# Patient Record
Sex: MALE | Race: WHITE | HISPANIC OR LATINO | Employment: FULL TIME | URBAN - METROPOLITAN AREA
[De-identification: names, ages, dates, MRNs, and addresses within clinical notes are randomized per-mention and may not be internally consistent; named-entity substitution may affect disease eponyms.]

---

## 2023-06-01 ENCOUNTER — HOSPITAL ENCOUNTER (EMERGENCY)
Facility: MEDICAL CENTER | Age: 38
End: 2023-06-01
Attending: EMERGENCY MEDICINE

## 2023-06-01 ENCOUNTER — APPOINTMENT (OUTPATIENT)
Dept: RADIOLOGY | Facility: MEDICAL CENTER | Age: 38
End: 2023-06-01
Attending: EMERGENCY MEDICINE

## 2023-06-01 VITALS
OXYGEN SATURATION: 96 % | HEART RATE: 131 BPM | SYSTOLIC BLOOD PRESSURE: 161 MMHG | DIASTOLIC BLOOD PRESSURE: 105 MMHG | RESPIRATION RATE: 18 BRPM | TEMPERATURE: 98.2 F | HEIGHT: 67 IN | WEIGHT: 197.09 LBS | BODY MASS INDEX: 30.93 KG/M2

## 2023-06-01 DIAGNOSIS — F10.930 ALCOHOL WITHDRAWAL SYNDROME WITHOUT COMPLICATION (HCC): ICD-10-CM

## 2023-06-01 DIAGNOSIS — R07.9 CHEST PAIN, UNSPECIFIED TYPE: ICD-10-CM

## 2023-06-01 DIAGNOSIS — F15.10 METHAMPHETAMINE USE (HCC): ICD-10-CM

## 2023-06-01 DIAGNOSIS — R00.2 PALPITATIONS: ICD-10-CM

## 2023-06-01 LAB
ALBUMIN SERPL BCP-MCNC: 4.6 G/DL (ref 3.2–4.9)
ALBUMIN/GLOB SERPL: 1.4 G/DL
ALP SERPL-CCNC: 129 U/L (ref 30–99)
ALT SERPL-CCNC: 58 U/L (ref 2–50)
ANION GAP SERPL CALC-SCNC: 19 MMOL/L (ref 7–16)
APPEARANCE UR: CLEAR
AST SERPL-CCNC: 55 U/L (ref 12–45)
BACTERIA #/AREA URNS HPF: NEGATIVE /HPF
BASOPHILS # BLD AUTO: 1.5 % (ref 0–1.8)
BASOPHILS # BLD: 0.1 K/UL (ref 0–0.12)
BILIRUB SERPL-MCNC: 0.4 MG/DL (ref 0.1–1.5)
BILIRUB UR QL STRIP.AUTO: NEGATIVE
BUN SERPL-MCNC: 6 MG/DL (ref 8–22)
CALCIUM ALBUM COR SERPL-MCNC: 8.9 MG/DL (ref 8.5–10.5)
CALCIUM SERPL-MCNC: 9.4 MG/DL (ref 8.5–10.5)
CHLORIDE SERPL-SCNC: 98 MMOL/L (ref 96–112)
CO2 SERPL-SCNC: 20 MMOL/L (ref 20–33)
COLOR UR: YELLOW
CREAT SERPL-MCNC: 0.66 MG/DL (ref 0.5–1.4)
EKG IMPRESSION: NORMAL
EOSINOPHIL # BLD AUTO: 0.02 K/UL (ref 0–0.51)
EOSINOPHIL NFR BLD: 0.3 % (ref 0–6.9)
EPI CELLS #/AREA URNS HPF: NEGATIVE /HPF
ERYTHROCYTE [DISTWIDTH] IN BLOOD BY AUTOMATED COUNT: 43.5 FL (ref 35.9–50)
GFR SERPLBLD CREATININE-BSD FMLA CKD-EPI: 125 ML/MIN/1.73 M 2
GLOBULIN SER CALC-MCNC: 3.4 G/DL (ref 1.9–3.5)
GLUCOSE SERPL-MCNC: 128 MG/DL (ref 65–99)
GLUCOSE UR STRIP.AUTO-MCNC: NEGATIVE MG/DL
HCT VFR BLD AUTO: 53.9 % (ref 42–52)
HGB BLD-MCNC: 17.6 G/DL (ref 14–18)
HYALINE CASTS #/AREA URNS LPF: ABNORMAL /LPF
IMM GRANULOCYTES # BLD AUTO: 0.02 K/UL (ref 0–0.11)
IMM GRANULOCYTES NFR BLD AUTO: 0.3 % (ref 0–0.9)
KETONES UR STRIP.AUTO-MCNC: 15 MG/DL
LEUKOCYTE ESTERASE UR QL STRIP.AUTO: NEGATIVE
LIPASE SERPL-CCNC: 15 U/L (ref 11–82)
LYMPHOCYTES # BLD AUTO: 2.18 K/UL (ref 1–4.8)
LYMPHOCYTES NFR BLD: 32.4 % (ref 22–41)
MCH RBC QN AUTO: 29 PG (ref 27–33)
MCHC RBC AUTO-ENTMCNC: 32.7 G/DL (ref 32.3–36.5)
MCV RBC AUTO: 88.9 FL (ref 81.4–97.8)
MICRO URNS: ABNORMAL
MONOCYTES # BLD AUTO: 0.72 K/UL (ref 0–0.85)
MONOCYTES NFR BLD AUTO: 10.7 % (ref 0–13.4)
NEUTROPHILS # BLD AUTO: 3.69 K/UL (ref 1.82–7.42)
NEUTROPHILS NFR BLD: 54.8 % (ref 44–72)
NITRITE UR QL STRIP.AUTO: NEGATIVE
NRBC # BLD AUTO: 0 K/UL
NRBC BLD-RTO: 0 /100 WBC (ref 0–0.2)
PH UR STRIP.AUTO: 6.5 [PH] (ref 5–8)
PLATELET # BLD AUTO: 355 K/UL (ref 164–446)
PMV BLD AUTO: 9.1 FL (ref 9–12.9)
POTASSIUM SERPL-SCNC: 4 MMOL/L (ref 3.6–5.5)
PROT SERPL-MCNC: 8 G/DL (ref 6–8.2)
PROT UR QL STRIP: 30 MG/DL
RBC # BLD AUTO: 6.06 M/UL (ref 4.7–6.1)
RBC # URNS HPF: ABNORMAL /HPF
RBC UR QL AUTO: NEGATIVE
SODIUM SERPL-SCNC: 137 MMOL/L (ref 135–145)
SP GR UR STRIP.AUTO: 1.01
TROPONIN T SERPL-MCNC: <6 NG/L (ref 6–19)
TROPONIN T SERPL-MCNC: <6 NG/L (ref 6–19)
UROBILINOGEN UR STRIP.AUTO-MCNC: 0.2 MG/DL
WBC # BLD AUTO: 6.7 K/UL (ref 4.8–10.8)
WBC #/AREA URNS HPF: ABNORMAL /HPF

## 2023-06-01 PROCEDURE — 93005 ELECTROCARDIOGRAM TRACING: CPT

## 2023-06-01 PROCEDURE — 96375 TX/PRO/DX INJ NEW DRUG ADDON: CPT

## 2023-06-01 PROCEDURE — 700105 HCHG RX REV CODE 258

## 2023-06-01 PROCEDURE — 99285 EMERGENCY DEPT VISIT HI MDM: CPT

## 2023-06-01 PROCEDURE — 700111 HCHG RX REV CODE 636 W/ 250 OVERRIDE (IP): Performed by: EMERGENCY MEDICINE

## 2023-06-01 PROCEDURE — 80053 COMPREHEN METABOLIC PANEL: CPT

## 2023-06-01 PROCEDURE — 700111 HCHG RX REV CODE 636 W/ 250 OVERRIDE (IP)

## 2023-06-01 PROCEDURE — 93005 ELECTROCARDIOGRAM TRACING: CPT | Performed by: EMERGENCY MEDICINE

## 2023-06-01 PROCEDURE — 96374 THER/PROPH/DIAG INJ IV PUSH: CPT

## 2023-06-01 PROCEDURE — 85025 COMPLETE CBC W/AUTO DIFF WBC: CPT

## 2023-06-01 PROCEDURE — 83690 ASSAY OF LIPASE: CPT

## 2023-06-01 PROCEDURE — 84484 ASSAY OF TROPONIN QUANT: CPT | Mod: 91

## 2023-06-01 PROCEDURE — 700105 HCHG RX REV CODE 258: Performed by: EMERGENCY MEDICINE

## 2023-06-01 PROCEDURE — 81001 URINALYSIS AUTO W/SCOPE: CPT

## 2023-06-01 PROCEDURE — 36415 COLL VENOUS BLD VENIPUNCTURE: CPT

## 2023-06-01 PROCEDURE — 71045 X-RAY EXAM CHEST 1 VIEW: CPT

## 2023-06-01 RX ORDER — LORAZEPAM 2 MG/ML
2 INJECTION INTRAMUSCULAR ONCE
Status: COMPLETED | OUTPATIENT
Start: 2023-06-01 | End: 2023-06-01

## 2023-06-01 RX ORDER — DIAZEPAM 5 MG/1
5-10 TABLET ORAL EVERY 6 HOURS PRN
Qty: 12 TABLET | Refills: 0 | Status: SHIPPED | OUTPATIENT
Start: 2023-06-01 | End: 2023-06-01 | Stop reason: SDUPTHER

## 2023-06-01 RX ORDER — DIAZEPAM 5 MG/1
5-10 TABLET ORAL EVERY 6 HOURS PRN
Qty: 12 TABLET | Refills: 0 | Status: SHIPPED | OUTPATIENT
Start: 2023-06-01 | End: 2023-06-04

## 2023-06-01 RX ORDER — DIAZEPAM 5 MG/ML
10 INJECTION, SOLUTION INTRAMUSCULAR; INTRAVENOUS ONCE
Status: COMPLETED | OUTPATIENT
Start: 2023-06-01 | End: 2023-06-01

## 2023-06-01 RX ORDER — SODIUM CHLORIDE, SODIUM LACTATE, POTASSIUM CHLORIDE, CALCIUM CHLORIDE 600; 310; 30; 20 MG/100ML; MG/100ML; MG/100ML; MG/100ML
1000 INJECTION, SOLUTION INTRAVENOUS ONCE
Status: COMPLETED | OUTPATIENT
Start: 2023-06-01 | End: 2023-06-01

## 2023-06-01 RX ORDER — SODIUM CHLORIDE 9 MG/ML
1000 INJECTION, SOLUTION INTRAVENOUS ONCE
Status: COMPLETED | OUTPATIENT
Start: 2023-06-01 | End: 2023-06-01

## 2023-06-01 RX ORDER — LORAZEPAM 2 MG/ML
2 INJECTION INTRAMUSCULAR ONCE
Status: DISCONTINUED | OUTPATIENT
Start: 2023-06-01 | End: 2023-06-01

## 2023-06-01 RX ADMIN — DIAZEPAM 10 MG: 5 INJECTION, SOLUTION INTRAMUSCULAR; INTRAVENOUS at 15:07

## 2023-06-01 RX ADMIN — LORAZEPAM 2 MG: 2 INJECTION INTRAMUSCULAR; INTRAVENOUS at 11:01

## 2023-06-01 RX ADMIN — SODIUM CHLORIDE 1000 ML: 9 INJECTION, SOLUTION INTRAVENOUS at 11:04

## 2023-06-01 RX ADMIN — SODIUM CHLORIDE, POTASSIUM CHLORIDE, SODIUM LACTATE AND CALCIUM CHLORIDE 1000 ML: 600; 310; 30; 20 INJECTION, SOLUTION INTRAVENOUS at 15:06

## 2023-06-01 ASSESSMENT — LIFESTYLE VARIABLES
PAROXYSMAL SWEATS: NO SWEAT VISIBLE
ON A TYPICAL DAY WHEN YOU DRINK ALCOHOL HOW MANY DRINKS DO YOU HAVE: 4
VISUAL DISTURBANCES: NOT PRESENT
TOTAL SCORE: 2
AUDITORY DISTURBANCES: NOT PRESENT
ORIENTATION AND CLOUDING OF SENSORIUM: ORIENTED AND CAN DO SERIAL ADDITIONS
DO YOU DRINK ALCOHOL: YES
HEADACHE, FULLNESS IN HEAD: NOT PRESENT
AGITATION: NORMAL ACTIVITY
AUDITORY DISTURBANCES: NOT PRESENT
NAUSEA AND VOMITING: NO NAUSEA AND NO VOMITING
ANXIETY: MODERATELY ANXIOUS OR GUARDED, SO ANXIETY IS INFERRED
HEADACHE, FULLNESS IN HEAD: NOT PRESENT
EVER HAD A DRINK FIRST THING IN THE MORNING TO STEADY YOUR NERVES TO GET RID OF A HANGOVER: NO
NAUSEA AND VOMITING: NO NAUSEA AND NO VOMITING
TREMOR: TREMOR NOT VISIBLE BUT CAN BE FELT, FINGERTIP TO FINGERTIP
TOTAL SCORE: 2
CONSUMPTION TOTAL: POSITIVE
ANXIETY: *
HOW MANY TIMES IN THE PAST YEAR HAVE YOU HAD 5 OR MORE DRINKS IN A DAY: 0
PAROXYSMAL SWEATS: NO SWEAT VISIBLE
AGITATION: NORMAL ACTIVITY
TOTAL SCORE: 5
TREMOR: TREMOR NOT VISIBLE BUT CAN BE FELT, FINGERTIP TO FINGERTIP
ORIENTATION AND CLOUDING OF SENSORIUM: ORIENTED AND CAN DO SERIAL ADDITIONS
TOTAL SCORE: 2
HAVE YOU EVER FELT YOU SHOULD CUT DOWN ON YOUR DRINKING: YES
VISUAL DISTURBANCES: NOT PRESENT
HAVE PEOPLE ANNOYED YOU BY CRITICIZING YOUR DRINKING: YES
TOTAL SCORE: 4
AVERAGE NUMBER OF DAYS PER WEEK YOU HAVE A DRINK CONTAINING ALCOHOL: 5
EVER FELT BAD OR GUILTY ABOUT YOUR DRINKING: NO

## 2023-06-01 ASSESSMENT — PAIN DESCRIPTION - PAIN TYPE
TYPE: ACUTE PAIN
TYPE: ACUTE PAIN

## 2023-06-01 NOTE — ED NOTES
Checked on bed, connected to monitor,  with unlabored respirations. Denied any new complaints. Gurney in low position, side rail up for pt safety. Call light within reach. Will continue to monitor

## 2023-06-01 NOTE — ED PROVIDER NOTES
ED Provider Note    CHIEF COMPLAINT  Chief Complaint   Patient presents with    Palpitations     Started today.    Chest Pain     Started today. Dull pain located mid chest. Pain does not radiate.    Abdominal Cramping     Mid abd cramping started today. Pt has been drinking alcohol in the past 3 days and has not been eating. Last drink was last night. Pt also admits to doing meth last night.       LIMITATION TO HISTORY   Select: Malaysian-speaking but we were able to use a  line    HPI    Charlie Weaver is a 37 y.o. male who presents to the Emergency Department with a chief complaint of chest pain he describes it simply as uncomfortable feeling.  It is not sharp it is not pressure.  It is not rating to the arm at the neck or the back.  Duration since 5:00 in the morning fairly constant.  May be worse with deep breaths.  And also located in the epigastric area.  Patient has no history of heart attack in the family.  He is positive for using crystal methamphetamines today/yesterday.  Also has been drinking and stopped this morning.    Patient has no history of smoking.  No history of cholesterol.  No family history.  No diabetes.  No history of hypertension.    OUTSIDE HISTORIAN(S):  Select: None    EXTERNAL RECORDS REVIEWED  Select: Other no previous visits noted    REVIEW OF SYSTEMS  General no fever or chills.  Pulmonary no difficulty breathing      PAST MEDICAL HISTORY  History reviewed. No pertinent past medical history.    FAMILY HISTORY  History reviewed. No pertinent family history.    SOCIAL HISTORY  Social History     Tobacco Use    Smoking status: Never    Smokeless tobacco: Never   Vaping Use    Vaping Use: Never used   Substance Use Topics    Alcohol use: Yes     Comment: occ    Drug use: Yes     Comment: meth     Social History     Substance and Sexual Activity   Drug Use Yes    Comment: meth       SURGICAL HISTORY  History reviewed. No pertinent surgical history.    CURRENT  "MEDICATIONS  No current facility-administered medications for this encounter.  No current outpatient medications on file.    ALLERGIES  No Known Allergies    PHYSICAL EXAM  VITAL SIGNS: BP (!) 148/105   Pulse (!) 155   Temp 36.5 °C (97.7 °F) (Temporal)   Resp 16   Ht 1.702 m (5' 7\")   Wt 89.4 kg (197 lb 1.5 oz)   SpO2 99%   BMI 30.87 kg/m²   Reviewed and tachycardia noted.  Elevated blood pressure noted.  Most likely secondary to methamphetamine use.  Constitutional: Well developed, Well nourished, uncomfortable appearing anxious..  HENT: Normocephalic, atraumatic, bilateral external ears normal, No intraoral erythema, edema, exudate  Eyes: PERRLA, conjunctiva pink, no scleral icterus.   Cardiovascular: Tachycardia no murmurs, rubs or gallops.  No dependent edema or calf tenderness  Respiratory: Lungs clear to auscultation bilaterally. No wheezes, rales, or rhonchi.  Abdominal:  Abdomen soft, non-tender, non distended. No rebound, or guarding.    Skin: No erythema, no rash. No wounds or bruising.  Genitourinary: No costovertebral angle tenderness.   Musculoskeletal: no deformities.   Neurologic: Alert, no facial droop noted. All extra ocular muscles intact. Moves all extremities with out weakness noed  Psychiatric: Anxious appearing fidgeting.        MEDICAL DECISION MAKING:  PROBLEMS EVALUATED THIS VISIT:  Chest pain.  The patient has a history of methamphetamine use.  May be methamphetamine use related.  Ischemia.  Less likely dissection, PE although in the differential among others.  Patient be treated with benzodiazepines.  Fluid.  Serial troponins.  Methamphetamine use.  Ativan for immediate treatment.  Counseling.  History of alcohol use.  Withdrawal may be complicating the symptoms as well.  Causing the tachycardia.  Treat with Ativan.  Counseling.         PLAN:  Medications   LORazepam (ATIVAN) injection 2 mg (2 mg Intravenous Given 6/1/23 1101)   NS (BOLUS) infusion 1,000 mL (1,000 mL Intravenous New " Bag 6/1/23 1104)   -year-old troponins  X-ray  EKG  Lab work.    RISK:  Pain with a history of methamphetamine use can cause cardiac ischemia which can cause long-term morbidity and even mortality if not properly detected.    RESULTS  Troponins were negative lab work.  Chest x-ray unremarkable.    LABS Ordered and Reviewed by Me:  Results for orders placed or performed during the hospital encounter of 06/01/23   CBC WITH DIFFERENTIAL   Result Value Ref Range    WBC 6.7 4.8 - 10.8 K/uL    RBC 6.06 4.70 - 6.10 M/uL    Hemoglobin 17.6 14.0 - 18.0 g/dL    Hematocrit 53.9 (H) 42.0 - 52.0 %    MCV 88.9 81.4 - 97.8 fL    MCH 29.0 27.0 - 33.0 pg    MCHC 32.7 32.3 - 36.5 g/dL    RDW 43.5 35.9 - 50.0 fL    Platelet Count 355 164 - 446 K/uL    MPV 9.1 9.0 - 12.9 fL    Neutrophils-Polys 54.80 44.00 - 72.00 %    Lymphocytes 32.40 22.00 - 41.00 %    Monocytes 10.70 0.00 - 13.40 %    Eosinophils 0.30 0.00 - 6.90 %    Basophils 1.50 0.00 - 1.80 %    Immature Granulocytes 0.30 0.00 - 0.90 %    Nucleated RBC 0.00 0.00 - 0.20 /100 WBC    Neutrophils (Absolute) 3.69 1.82 - 7.42 K/uL    Lymphs (Absolute) 2.18 1.00 - 4.80 K/uL    Monos (Absolute) 0.72 0.00 - 0.85 K/uL    Eos (Absolute) 0.02 0.00 - 0.51 K/uL    Baso (Absolute) 0.10 0.00 - 0.12 K/uL    Immature Granulocytes (abs) 0.02 0.00 - 0.11 K/uL    NRBC (Absolute) 0.00 K/uL   COMP METABOLIC PANEL   Result Value Ref Range    Sodium 137 135 - 145 mmol/L    Potassium 4.0 3.6 - 5.5 mmol/L    Chloride 98 96 - 112 mmol/L    Co2 20 20 - 33 mmol/L    Anion Gap 19.0 (H) 7.0 - 16.0    Glucose 128 (H) 65 - 99 mg/dL    Bun 6 (L) 8 - 22 mg/dL    Creatinine 0.66 0.50 - 1.40 mg/dL    Calcium 9.4 8.5 - 10.5 mg/dL    AST(SGOT) 55 (H) 12 - 45 U/L    ALT(SGPT) 58 (H) 2 - 50 U/L    Alkaline Phosphatase 129 (H) 30 - 99 U/L    Total Bilirubin 0.4 0.1 - 1.5 mg/dL    Albumin 4.6 3.2 - 4.9 g/dL    Total Protein 8.0 6.0 - 8.2 g/dL    Globulin 3.4 1.9 - 3.5 g/dL    A-G Ratio 1.4 g/dL   LIPASE   Result Value  Ref Range    Lipase 15 11 - 82 U/L   URINALYSIS    Specimen: Urine   Result Value Ref Range    Color Yellow     Character Clear     Specific Gravity 1.014 <1.035    Ph 6.5 5.0 - 8.0    Glucose Negative Negative mg/dL    Ketones 15 (A) Negative mg/dL    Protein 30 (A) Negative mg/dL    Bilirubin Negative Negative    Urobilinogen, Urine 0.2 Negative    Nitrite Negative Negative    Leukocyte Esterase Negative Negative    Occult Blood Negative Negative    Micro Urine Req Microscopic    CORRECTED CALCIUM   Result Value Ref Range    Correct Calcium 8.9 8.5 - 10.5 mg/dL   ESTIMATED GFR   Result Value Ref Range    GFR (CKD-EPI) 125 >60 mL/min/1.73 m 2   Troponin - STAT Once   Result Value Ref Range    Troponin T <6 6 - 19 ng/L   TROPONIN   Result Value Ref Range    Troponin T <6 6 - 19 ng/L   URINE MICROSCOPIC (W/UA)   Result Value Ref Range    WBC 0-2 (A) /hpf    RBC 0-2 (A) /hpf    Bacteria Negative None /hpf    Epithelial Cells Negative /hpf    Hyaline Cast 0-2 /lpf   EKG   Result Value Ref Range    Report       [facility] Emergency Dept.    Test Date:  2023  Pt Name:    FITO DENNISON      Department: ER  MRN:        [id number]                      Room:  Gender:     Male                         Technician: [id number]  :        1985                   Requested By:ER TRIAGE PROTOCOL  Order #:    [id number]                    Reading MD: James STEPHENSON MD    Measurements  Intervals                                Axis  Rate:       124                          P:          33  AZ:         140                          QRS:        259  QRSD:       105                          T:          34  QT:         316  QTc:        454    Interpretive Statements  Sinus tachycardia  Rate of 124.  Normal AZ.  QRS is borderline.  Borderline left axis deviation.  I  do not appreciate any ST segment elevations or depressions.  No previous to  compare for.  Impression tachycardia, abnormal.  No  acute ischemic findings.  Electronically Signed On 6-1-2023 10:53:48 PDT by Rosalinda STEPHENSON MD         Rhythm Strip: Interpretation by me of 130 sinus    EKG Interpretation by me see labs section        RADIOLOGY    Radiologist interpretation:   DX-CHEST-PORTABLE (1 VIEW)   Final Result      No acute cardiac or pulmonary abnormalities are identified.            ED COURSE:    ED Observation Status? Yes; Patient placed in observation status at 10:54 AM 06/01/23     Observation plan is as follows:   Fluids.  Benzodiazepine.  Serial troponins.  X-ray.    INTERVENTIONS BY ME:  Medications   LORazepam (ATIVAN) injection 2 mg (2 mg Intravenous Given 6/1/23 1101)   NS (BOLUS) infusion 1,000 mL (0 mL Intravenous Stopped 6/1/23 1215)   lactated ringers (LR) bolus (1,000 mL Intravenous New Bag 6/1/23 1506)   diazePAM (VALIUM) injection 10 mg (10 mg Intravenous Given 6/1/23 1507)       Response on recheck:  2:17 PM  Notes more relaxed less anxious.  Patient will go ahead and get more fluids.  Still tachycardic..        Patient discharged from ED observation at 3:11 PM 06/01/23 improving..    Heart rate is now down to 120.  Patient is feeling still slightly dizzy.  He just received Valium 10 mg.    We will see alert to come see if the patient can benefit from any resources as patient states that he would like help for his alcohol and drug use.    We will go ahead and give him Valium for short course.  He is given strict instructions that if he drinks and takes this medicine together he has a chance of dying.  He declines that he will drink while taking this medicine.      Diagnostic tests and prescription drugs considered including, but not limited to: Select: See above Valium IV fluids Ativan.    Escalation of care considered, and ultimately not performed: We will get some outpatient help for him..     Barriers to care at this time, including but not limited to: Select: None noted..       FINAL DISPO PLAN   New  Prescriptions    DIAZEPAM (VALIUM) 5 MG TAB    Take 1-2 Tablets by mouth every 6 hours as needed for Anxiety for up to 3 days.     Alert team to see the patient for resources.    Followup:  [facility], Emergency Dept  [address] [id number] [phone]  Go to   If symptoms worsen      CONDITION: Improved.     FINAL IMPRESSION  1. Palpitations    2. Chest pain, unspecified type    3. Alcohol withdrawal syndrome without complication (HCC)    4. Methamphetamine use (HCC)       ED Observation Care

## 2023-06-01 NOTE — ED NOTES
Checked on bed, connected to monitor,  with unlabored respirations. Gurney in low position, side rail up for pt safety. Call light within reach. Will continue to monitor.

## 2023-06-01 NOTE — ED NOTES
Pt attempted to provide urinal sample. While standing his HR went into the high 150's and he became dizzy and SOB.

## 2023-06-01 NOTE — ED NOTES
Assumed care of patient, patient bedside report received from CASEY Carlisle . Pt AAO X 4 , respirations even and unlabored, on room air . Introduced self as pt RN, POC discussed, call light in reach.

## 2023-06-01 NOTE — ED TRIAGE NOTES
"Chief Complaint   Patient presents with    Palpitations     Started today.    Chest Pain     Started today. Dull pain located mid chest. Pain does not radiate.    Abdominal Cramping     Mid abd cramping started today. Pt has been drinking alcohol in the past 3 days and has not been eating. Last drink was last night. Pt also admits to doing meth last night.     BP (!) 134/99   Pulse (!) 128   Temp 36.5 °C (97.7 °F) (Temporal)   Resp 16   Ht 1.702 m (5' 7\")   Wt 89.4 kg (197 lb 1.5 oz)   SpO2 99%   BMI 30.87 kg/m²     "

## 2023-06-01 NOTE — ED NOTES
Patient ambulated to and from the rest room in steady gait. urine specimen collected and sent to lab.

## 2023-06-01 NOTE — ED NOTES
Pt given meds as per MAR, education given pertinent to meds, verbalize understanding. Interpretor service use to educate patient

## 2023-06-01 NOTE — ED NOTES
Pt samples for repeat troponin collected and sent to lab. Interpretor service use to educate patient

## 2023-06-02 ENCOUNTER — APPOINTMENT (OUTPATIENT)
Dept: RADIOLOGY | Facility: MEDICAL CENTER | Age: 38
End: 2023-06-02
Attending: EMERGENCY MEDICINE

## 2023-06-02 ENCOUNTER — HOSPITAL ENCOUNTER (EMERGENCY)
Facility: MEDICAL CENTER | Age: 38
End: 2023-06-02
Attending: EMERGENCY MEDICINE

## 2023-06-02 VITALS
RESPIRATION RATE: 17 BRPM | WEIGHT: 197.09 LBS | TEMPERATURE: 98 F | BODY MASS INDEX: 30.93 KG/M2 | DIASTOLIC BLOOD PRESSURE: 73 MMHG | OXYGEN SATURATION: 97 % | HEIGHT: 67 IN | SYSTOLIC BLOOD PRESSURE: 133 MMHG | HEART RATE: 70 BPM

## 2023-06-02 DIAGNOSIS — R10.13 EPIGASTRIC PAIN: ICD-10-CM

## 2023-06-02 DIAGNOSIS — R10.11 RIGHT UPPER QUADRANT ABDOMINAL PAIN: ICD-10-CM

## 2023-06-02 DIAGNOSIS — K29.20 ACUTE ALCOHOLIC GASTRITIS WITHOUT HEMORRHAGE: ICD-10-CM

## 2023-06-02 LAB
ALBUMIN SERPL BCP-MCNC: 4.3 G/DL (ref 3.2–4.9)
ALBUMIN/GLOB SERPL: 1.2 G/DL
ALP SERPL-CCNC: 130 U/L (ref 30–99)
ALT SERPL-CCNC: 53 U/L (ref 2–50)
ANION GAP SERPL CALC-SCNC: 12 MMOL/L (ref 7–16)
AST SERPL-CCNC: 58 U/L (ref 12–45)
BASOPHILS # BLD AUTO: 1 % (ref 0–1.8)
BASOPHILS # BLD: 0.08 K/UL (ref 0–0.12)
BILIRUB SERPL-MCNC: 0.9 MG/DL (ref 0.1–1.5)
BUN SERPL-MCNC: 10 MG/DL (ref 8–22)
CALCIUM ALBUM COR SERPL-MCNC: 9.3 MG/DL (ref 8.5–10.5)
CALCIUM SERPL-MCNC: 9.5 MG/DL (ref 8.5–10.5)
CHLORIDE SERPL-SCNC: 99 MMOL/L (ref 96–112)
CO2 SERPL-SCNC: 24 MMOL/L (ref 20–33)
CREAT SERPL-MCNC: 0.79 MG/DL (ref 0.5–1.4)
EOSINOPHIL # BLD AUTO: 0.06 K/UL (ref 0–0.51)
EOSINOPHIL NFR BLD: 0.7 % (ref 0–6.9)
ERYTHROCYTE [DISTWIDTH] IN BLOOD BY AUTOMATED COUNT: 42.5 FL (ref 35.9–50)
GFR SERPLBLD CREATININE-BSD FMLA CKD-EPI: 118 ML/MIN/1.73 M 2
GLOBULIN SER CALC-MCNC: 3.7 G/DL (ref 1.9–3.5)
GLUCOSE SERPL-MCNC: 106 MG/DL (ref 65–99)
HCT VFR BLD AUTO: 51.9 % (ref 42–52)
HGB BLD-MCNC: 17.5 G/DL (ref 14–18)
IMM GRANULOCYTES # BLD AUTO: 0.03 K/UL (ref 0–0.11)
IMM GRANULOCYTES NFR BLD AUTO: 0.4 % (ref 0–0.9)
LIPASE SERPL-CCNC: 13 U/L (ref 11–82)
LYMPHOCYTES # BLD AUTO: 1.78 K/UL (ref 1–4.8)
LYMPHOCYTES NFR BLD: 21.1 % (ref 22–41)
MCH RBC QN AUTO: 30 PG (ref 27–33)
MCHC RBC AUTO-ENTMCNC: 33.7 G/DL (ref 32.3–36.5)
MCV RBC AUTO: 88.9 FL (ref 81.4–97.8)
MONOCYTES # BLD AUTO: 0.78 K/UL (ref 0–0.85)
MONOCYTES NFR BLD AUTO: 9.3 % (ref 0–13.4)
NEUTROPHILS # BLD AUTO: 5.69 K/UL (ref 1.82–7.42)
NEUTROPHILS NFR BLD: 67.5 % (ref 44–72)
NRBC # BLD AUTO: 0 K/UL
NRBC BLD-RTO: 0 /100 WBC (ref 0–0.2)
PLATELET # BLD AUTO: 296 K/UL (ref 164–446)
PMV BLD AUTO: 9.3 FL (ref 9–12.9)
POTASSIUM SERPL-SCNC: 4.4 MMOL/L (ref 3.6–5.5)
PROT SERPL-MCNC: 8 G/DL (ref 6–8.2)
RBC # BLD AUTO: 5.84 M/UL (ref 4.7–6.1)
SODIUM SERPL-SCNC: 135 MMOL/L (ref 135–145)
WBC # BLD AUTO: 8.4 K/UL (ref 4.8–10.8)

## 2023-06-02 PROCEDURE — 85025 COMPLETE CBC W/AUTO DIFF WBC: CPT

## 2023-06-02 PROCEDURE — 80053 COMPREHEN METABOLIC PANEL: CPT

## 2023-06-02 PROCEDURE — 76705 ECHO EXAM OF ABDOMEN: CPT

## 2023-06-02 PROCEDURE — 99284 EMERGENCY DEPT VISIT MOD MDM: CPT

## 2023-06-02 PROCEDURE — 83690 ASSAY OF LIPASE: CPT

## 2023-06-02 PROCEDURE — 36415 COLL VENOUS BLD VENIPUNCTURE: CPT

## 2023-06-02 PROCEDURE — A9270 NON-COVERED ITEM OR SERVICE: HCPCS | Performed by: EMERGENCY MEDICINE

## 2023-06-02 PROCEDURE — 700102 HCHG RX REV CODE 250 W/ 637 OVERRIDE(OP): Performed by: EMERGENCY MEDICINE

## 2023-06-02 RX ORDER — OMEPRAZOLE 20 MG/1
20 CAPSULE, DELAYED RELEASE ORAL ONCE
Status: COMPLETED | OUTPATIENT
Start: 2023-06-02 | End: 2023-06-02

## 2023-06-02 RX ORDER — OMEPRAZOLE 20 MG/1
20 CAPSULE, DELAYED RELEASE ORAL DAILY
Qty: 30 CAPSULE | Refills: 0 | Status: SHIPPED | OUTPATIENT
Start: 2023-06-02 | End: 2023-11-12 | Stop reason: SDUPTHER

## 2023-06-02 RX ORDER — SUCRALFATE 1 G/1
1 TABLET ORAL
Qty: 120 TABLET | Refills: 0 | Status: SHIPPED | OUTPATIENT
Start: 2023-06-02 | End: 2023-07-02

## 2023-06-02 RX ADMIN — OMEPRAZOLE 20 MG: 20 CAPSULE, DELAYED RELEASE ORAL at 21:15

## 2023-06-02 RX ADMIN — LIDOCAINE HYDROCHLORIDE 30 ML: 20 SOLUTION OROPHARYNGEAL at 21:15

## 2023-06-02 ASSESSMENT — FIBROSIS 4 INDEX: FIB4 SCORE: 0.71

## 2023-06-02 NOTE — DISCHARGE PLANNING
ALERT team  note: ALERT team BH note:   Khmer-speaking  utilized  Writer RN reviewed community CD and MH resources with pt, with written information given, including Peer Recovery Support/[facility], [facility], and Replaced by [facility]; pt verbalized understanding; pt states current substance use includes ETOH, up to 12 beers daily with last use 5/31/23, and Methamphetamines occasionally smoking with last use 5/31/23; [t denies current mental health providers, psych meds, or h/o inpt MH/CD tx; pt recently arrived in [place] from [facility] 6 months ago; working in construction; no SI, HI, or self-harm ideation noted; pt Dc'd to self tonight

## 2023-06-02 NOTE — ED TRIAGE NOTES
"Chief Complaint   Patient presents with    Abdominal Pain     Patient reports upper abdominal pain. Patient was seen yesterday for the same after alcohol and drug use. Patient denies alcohol or drug use today       36 yo male to triage for above complaint. Patient reports he was unable to  prescription yesterday due to pharmacy being closed.    Pt is alert and oriented, speaking in full sentences, follows commands and responds appropriately to questions.     Patient placed back in lobby and educated on triage process. Asked to inform RN of any changes.    BP (!) 149/109   Pulse 99   Temp 36.8 °C (98.2 °F) (Temporal)   Resp 18   Ht 1.702 m (5' 7\")   Wt 89.4 kg (197 lb 1.5 oz)   SpO2 97%   BMI 30.87 kg/m²     "

## 2023-06-02 NOTE — ED NOTES
Pt discharged to ED exit. GCS 15. IV discontinued and gauze placed, pt in possession of belongings. Pt provided discharge education and information pertaining to medications and follow up appointments. Pt received copy of discharge instructions and verbalized understanding.

## 2023-06-03 NOTE — DISCHARGE INSTRUCTIONS
Avoid alcohol, ibuprofen, aspirin and naproxen.  Take the antacid medication daily.  Return the emergency department if you have increasing pain, pain moves the right lower quadrant, vomiting blood or significant blood in stool.

## 2023-06-03 NOTE — ED NOTES
Bedside report received from CASEY Catalan. Pt resting comfortably and aware of POC with call light available and in reach. Pt on RA. Fall precautions in place and appropriate for pt. Pt reports no needs at this time. Appropriate equipment in room.

## 2023-06-03 NOTE — ED NOTES
Patient discharged home per ERP.  Discharge teaching and education discussed with patient. POC discussed.   Patient verbalized understanding of discharge teaching and education. No other questions at this time.     RX x 2 given to patient.   PIV removed.     VSS. Patient alert and oriented. Patient arranged ride for self. Able to ambulate off unit safely with steady gait.    used to go over discharge instructions

## 2023-06-03 NOTE — ED PROVIDER NOTES
ER Provider Note    Scribed for Jake Angeles M.d. by Melissa Meraz. 6/2/2023  6:11 PM    Primary Care Provider: Pcp Pt States None    CHIEF COMPLAINT  Chief Complaint   Patient presents with    Abdominal Pain     Patient reports upper abdominal pain. Patient was seen yesterday for the same after alcohol and drug use. Patient denies alcohol or drug use today     EXTERNAL RECORDS REVIEWED  Outpatient Notes The patient was seen here yesterday after using alcohol and methamphetamine. He was treated and his labs were unremarkable, so he was then discharged.     HPI/ROS  LIMITATION TO HISTORY   Select: Language Mosotho,  #[id number] Used   OUTSIDE HISTORIAN(S):  None.    Charlie Weaver is a 37 y.o. male who presents to the ED complaining of intermittent upper abdominal pain onset 5 days ago. The patient states he also has burning sensation, and abdominal irritation, but denies any chest pain, nausea or vomiting. He describes that he was here yesterday all day, but today he comes in for abdominal pain and a pain under his rib onset 5 days ago. The patient states that the abdominal pain is exacerbated when he eats. The patient notes that he has had black stool a few days ago, but denies any blood in his stool. He denies the use of Pepto bismol. The patient denies any history of ulcers or abdominal surgery. He denies the use of ibuprofen or Aleve. He denies having any allergies to medications.     PAST MEDICAL HISTORY  History reviewed. No pertinent past medical history.    SURGICAL HISTORY  History reviewed. No pertinent surgical history.    FAMILY HISTORY  No family history pertinent.    SOCIAL HISTORY   reports that he has never smoked. He has never used smokeless tobacco. He reports current alcohol use. He reports current drug use.    CURRENT MEDICATIONS  Discharge Medication List as of 6/2/2023 10:07 PM        CONTINUE these medications which have NOT CHANGED    Details   diazePAM  "(VALIUM) 5 MG Tab Take 1-2 Tablets by mouth every 6 hours as needed for Anxiety for up to 3 days., Disp-12 Tablet, R-0, Normal           ALLERGIES  Patient has no known allergies.    PHYSICAL EXAM  BP (!) 149/109   Pulse 99   Temp 36.8 °C (98.2 °F) (Temporal)   Resp 18   Ht 1.702 m (5' 7\")   Wt 89.4 kg (197 lb 1.5 oz)   SpO2 97%   BMI 30.87 kg/m²     Constitutional: Well developed, Well nourished, Mild distress.   HENT: Normocephalic, Atraumatic, Oropharynx moist, No oral exudates.   Eyes: Conjunctiva normal, No discharge.   Cardiovascular: Normal heart rate, Normal rhythm, No murmurs, equal pulses.   Pulmonary: Normal breath sounds, No respiratory distress, No wheezing, No rales, No rhonchi.  Chest: No chest wall tenderness or deformity.   Abdomen:Soft, No masses, no rebound, no guarding. Tenderness in the epigastric region and right upper quadrant.   Back: No CVA tenderness. No spinal arteritis.   Musculoskeletal: No major deformities noted, No tenderness.   Skin: Warm, Dry, No erythema, No rash.   Neurologic: Alert & oriented x 3, Normal motor function,  No focal deficits noted.   Psychiatric: Affect normal, Judgment normal, Mood normal.      DIAGNOSTIC STUDIES    Labs:   Results for orders placed or performed during the hospital encounter of 06/02/23   CBC WITH DIFFERENTIAL   Result Value Ref Range    WBC 8.4 4.8 - 10.8 K/uL    RBC 5.84 4.70 - 6.10 M/uL    Hemoglobin 17.5 14.0 - 18.0 g/dL    Hematocrit 51.9 42.0 - 52.0 %    MCV 88.9 81.4 - 97.8 fL    MCH 30.0 27.0 - 33.0 pg    MCHC 33.7 32.3 - 36.5 g/dL    RDW 42.5 35.9 - 50.0 fL    Platelet Count 296 164 - 446 K/uL    MPV 9.3 9.0 - 12.9 fL    Neutrophils-Polys 67.50 44.00 - 72.00 %    Lymphocytes 21.10 (L) 22.00 - 41.00 %    Monocytes 9.30 0.00 - 13.40 %    Eosinophils 0.70 0.00 - 6.90 %    Basophils 1.00 0.00 - 1.80 %    Immature Granulocytes 0.40 0.00 - 0.90 %    Nucleated RBC 0.00 0.00 - 0.20 /100 WBC    Neutrophils (Absolute) 5.69 1.82 - 7.42 K/uL    " Lymphs (Absolute) 1.78 1.00 - 4.80 K/uL    Monos (Absolute) 0.78 0.00 - 0.85 K/uL    Eos (Absolute) 0.06 0.00 - 0.51 K/uL    Baso (Absolute) 0.08 0.00 - 0.12 K/uL    Immature Granulocytes (abs) 0.03 0.00 - 0.11 K/uL    NRBC (Absolute) 0.00 K/uL   COMP METABOLIC PANEL   Result Value Ref Range    Sodium 135 135 - 145 mmol/L    Potassium 4.4 3.6 - 5.5 mmol/L    Chloride 99 96 - 112 mmol/L    Co2 24 20 - 33 mmol/L    Anion Gap 12.0 7.0 - 16.0    Glucose 106 (H) 65 - 99 mg/dL    Bun 10 8 - 22 mg/dL    Creatinine 0.79 0.50 - 1.40 mg/dL    Calcium 9.5 8.5 - 10.5 mg/dL    AST(SGOT) 58 (H) 12 - 45 U/L    ALT(SGPT) 53 (H) 2 - 50 U/L    Alkaline Phosphatase 130 (H) 30 - 99 U/L    Total Bilirubin 0.9 0.1 - 1.5 mg/dL    Albumin 4.3 3.2 - 4.9 g/dL    Total Protein 8.0 6.0 - 8.2 g/dL    Globulin 3.7 (H) 1.9 - 3.5 g/dL    A-G Ratio 1.2 g/dL   LIPASE   Result Value Ref Range    Lipase 13 11 - 82 U/L   CORRECTED CALCIUM   Result Value Ref Range    Correct Calcium 9.3 8.5 - 10.5 mg/dL   ESTIMATED GFR   Result Value Ref Range    GFR (CKD-EPI) 118 >60 mL/min/1.73 m 2      Radiology:     Radiologist interpretation:   US-RUQ   Final Result      1.  Echogenic liver suggesting hepatic steatosis.      2.  Gallbladder sludge.      3.  Pancreas obscured by bowel gas.         COURSE & MEDICAL DECISION MAKING     ED Observation Status? Yes; I am placing the patient in to an observation status due to a diagnostic uncertainty as well as therapeutic intensity. Patient placed in observation status at 6:33 PM, 6/2/2023.     Observation plan is as follows: Will perform lab work and imaging for further evaluation of his symptoms.     Upon Reevaluation, the patient's condition has: Improved; and will be discharged.    Patient discharged from ED Observation status at 9:35 PM (Time) 6/2/2023 (Date).     INITIAL ASSESSMENT, COURSE AND PLAN  Care Narrative:     6:32 PM - Patient seen and examined at bedside. Discussed plan of care, including performing lab  work and imaging. Patient agrees to the plan of care. The patient will be medicated with a GI cocktail 30 mL and Prilosec 20 mg for his symptoms. Ordered for Lipase, CMP, CBC w/ Diff., and US-RUQ to evaluate his symptoms. Differential diagnoses include, but are not limited to: Cholecystitis, Pancreatitis, or Gastritis.     9:30 PM - Patient was reevaluated at bedside. The patient notes that the medications alleviated the abdominal pain. Discussed lab and radiology results with the patient and informed them that there are no signs of stones or infection of his gallbladder, but that he does have gallbladder sludge. I explained to the patient that this is most likely caused by his alcohol consumption. I recommended that the patient avoid the consumption of alcohol, as well as the use of ibuprofen, aspirin, or tylenol. I informed the patient that I will discharge him home with some medications for at home pain management. I instructed the patient to crush and mix the Carafate with water and to drink the mixture. I also informed the patient that it will take a few weeks for his stomach to heal entirely. Discussed discharge instructions and return precautions with the patient and they were cleared for discharge. Patient was given the opportunity to ask any further questions. He is comfortable with discharge at this time.       Medical Decision Making:     PROBLEM LIST  Problem #1 epigastric abdominal pain and right upper quadrant abdominal pain at this point time I think this is likely secondary to an alcohol gastritis.  Patient did have some pain and tenderness in the right upper quadrant therefore ultrasound was done this does not show any cholecystitis or gallbladder stones.  Patient was given a GI cocktail with that his pain is gone away therefore I think this more likely gastritis or an ulcer.  Patient had been drinking heavily over the last couple days which would have probably caused this.  At this point time will  discharge patient home on Prilosec.  Discussed stopping alcohol as well as NSAIDs.  We will also give him Carafate to help coat the stomach.    DISPOSITION AND DISCUSSIONS  I have discussed management of the patient with the following physicians and LIGIA's:  None.    Discussion of management with other [facility] or appropriate source(s): None       Barriers to care at this time, including but not limited to: Patient does not have established PCP.     Decision tools and prescription drugs considered including, but not limited to:  We will start the patient on PPI as well as Carafate. .    The patient will return for new or worsening symptoms and is stable at the time of discharge.    DISPOSITION:  Patient will be discharged home in stable condition.    FOLLOW UP:  Your doctor    Schedule an appointment as soon as possible for a visit in 1 week      [facility]  [address] [facility] [address]  [phone]    If you need a doctor    [facility]  [address] [facility] [address]  [phone]    If you need a doctor    [facility]  [address] [facility] [address]  [phone]    If you need a GI  doctor for endoscopy if you symptoms do not improve in 3 -4 weeks    [facility]  [address] [facility] [address]  [phone]    If you need a GI  doctor for endoscopy if you symptoms do not improve in 3 -4 weeks    OUTPATIENT MEDICATIONS:  Discharge Medication List as of 6/2/2023 10:07 PM        START taking these medications    Details   omeprazole (PRILOSEC) 20 MG delayed-release capsule Take 1 Capsule by mouth every day., Disp-30 Capsule, R-0, Normal      sucralfate (CARAFATE) 1 GM Tab Take 1 Tablet by mouth 4 Times a Day,Before Meals and at Bedtime for 30 days. Crush tablet and mix with about 20 mL of water to make a slurry and drink, Disp-120 Tablet, R-0, Normal            FINAL DIAGNOSIS  1. Epigastric pain    2. Right upper quadrant  abdominal pain    3. Acute alcoholic gastritis without hemorrhage       IMelissa (Scribe), am scribing for, and in the presence of, ERICK Squires*.    Electronically signed by: Melissa Meraz (Scribe), 6/2/2023    IJake M.* personally performed the services described in this documentation, as scribed by Melissa Meraz in my presence, and it is both accurate and complete.      The note accurately reflects work and decisions made by me.  Jake Angeles M.D.  6/3/2023  12:23 AM

## 2023-11-08 ENCOUNTER — APPOINTMENT (OUTPATIENT)
Dept: RADIOLOGY | Facility: MEDICAL CENTER | Age: 38
End: 2023-11-08
Attending: EMERGENCY MEDICINE

## 2023-11-08 ENCOUNTER — HOSPITAL ENCOUNTER (EMERGENCY)
Facility: MEDICAL CENTER | Age: 38
End: 2023-11-08
Attending: EMERGENCY MEDICINE

## 2023-11-08 VITALS
TEMPERATURE: 98.4 F | HEART RATE: 97 BPM | HEIGHT: 67 IN | RESPIRATION RATE: 17 BRPM | OXYGEN SATURATION: 93 % | SYSTOLIC BLOOD PRESSURE: 123 MMHG | DIASTOLIC BLOOD PRESSURE: 61 MMHG | BODY MASS INDEX: 31.39 KG/M2 | WEIGHT: 200 LBS

## 2023-11-08 DIAGNOSIS — F10.129 ALCOHOL ABUSE WITH INTOXICATION (HCC): ICD-10-CM

## 2023-11-08 DIAGNOSIS — R10.13 EPIGASTRIC PAIN: ICD-10-CM

## 2023-11-08 LAB
ALBUMIN SERPL BCP-MCNC: 3.9 G/DL (ref 3.2–4.9)
ALBUMIN/GLOB SERPL: 1.2 G/DL
ALP SERPL-CCNC: 100 U/L (ref 30–99)
ALT SERPL-CCNC: 227 U/L (ref 2–50)
ANION GAP SERPL CALC-SCNC: 18 MMOL/L (ref 7–16)
AST SERPL-CCNC: 178 U/L (ref 12–45)
BASOPHILS # BLD AUTO: 1.6 % (ref 0–1.8)
BASOPHILS # BLD: 0.1 K/UL (ref 0–0.12)
BILIRUB SERPL-MCNC: 0.3 MG/DL (ref 0.1–1.5)
BUN SERPL-MCNC: 7 MG/DL (ref 8–22)
CALCIUM ALBUM COR SERPL-MCNC: 8.6 MG/DL (ref 8.5–10.5)
CALCIUM SERPL-MCNC: 8.5 MG/DL (ref 8.5–10.5)
CHLORIDE SERPL-SCNC: 102 MMOL/L (ref 96–112)
CO2 SERPL-SCNC: 19 MMOL/L (ref 20–33)
CREAT SERPL-MCNC: 0.72 MG/DL (ref 0.5–1.4)
EOSINOPHIL # BLD AUTO: 0.03 K/UL (ref 0–0.51)
EOSINOPHIL NFR BLD: 0.5 % (ref 0–6.9)
ERYTHROCYTE [DISTWIDTH] IN BLOOD BY AUTOMATED COUNT: 44.4 FL (ref 35.9–50)
ETHANOL BLD-MCNC: 218.6 MG/DL
GFR SERPLBLD CREATININE-BSD FMLA CKD-EPI: 121 ML/MIN/1.73 M 2
GLOBULIN SER CALC-MCNC: 3.3 G/DL (ref 1.9–3.5)
GLUCOSE SERPL-MCNC: 145 MG/DL (ref 65–99)
HCT VFR BLD AUTO: 48.2 % (ref 42–52)
HGB BLD-MCNC: 16.3 G/DL (ref 14–18)
IMM GRANULOCYTES # BLD AUTO: 0.01 K/UL (ref 0–0.11)
IMM GRANULOCYTES NFR BLD AUTO: 0.2 % (ref 0–0.9)
LIPASE SERPL-CCNC: 16 U/L (ref 11–82)
LYMPHOCYTES # BLD AUTO: 1.96 K/UL (ref 1–4.8)
LYMPHOCYTES NFR BLD: 31.1 % (ref 22–41)
MCH RBC QN AUTO: 30.5 PG (ref 27–33)
MCHC RBC AUTO-ENTMCNC: 33.8 G/DL (ref 32.3–36.5)
MCV RBC AUTO: 90.1 FL (ref 81.4–97.8)
MONOCYTES # BLD AUTO: 0.66 K/UL (ref 0–0.85)
MONOCYTES NFR BLD AUTO: 10.5 % (ref 0–13.4)
NEUTROPHILS # BLD AUTO: 3.55 K/UL (ref 1.82–7.42)
NEUTROPHILS NFR BLD: 56.1 % (ref 44–72)
NRBC # BLD AUTO: 0 K/UL
NRBC BLD-RTO: 0 /100 WBC (ref 0–0.2)
PLATELET # BLD AUTO: 307 K/UL (ref 164–446)
PMV BLD AUTO: 9.1 FL (ref 9–12.9)
POTASSIUM SERPL-SCNC: 4 MMOL/L (ref 3.6–5.5)
PROT SERPL-MCNC: 7.2 G/DL (ref 6–8.2)
RBC # BLD AUTO: 5.35 M/UL (ref 4.7–6.1)
SODIUM SERPL-SCNC: 139 MMOL/L (ref 135–145)
WBC # BLD AUTO: 6.3 K/UL (ref 4.8–10.8)

## 2023-11-08 PROCEDURE — 700105 HCHG RX REV CODE 258: Performed by: EMERGENCY MEDICINE

## 2023-11-08 PROCEDURE — 96374 THER/PROPH/DIAG INJ IV PUSH: CPT

## 2023-11-08 PROCEDURE — 83690 ASSAY OF LIPASE: CPT

## 2023-11-08 PROCEDURE — 36415 COLL VENOUS BLD VENIPUNCTURE: CPT

## 2023-11-08 PROCEDURE — 700111 HCHG RX REV CODE 636 W/ 250 OVERRIDE (IP): Mod: JZ | Performed by: EMERGENCY MEDICINE

## 2023-11-08 PROCEDURE — 700117 HCHG RX CONTRAST REV CODE 255: Performed by: EMERGENCY MEDICINE

## 2023-11-08 PROCEDURE — 76705 ECHO EXAM OF ABDOMEN: CPT

## 2023-11-08 PROCEDURE — 96375 TX/PRO/DX INJ NEW DRUG ADDON: CPT

## 2023-11-08 PROCEDURE — 85025 COMPLETE CBC W/AUTO DIFF WBC: CPT

## 2023-11-08 PROCEDURE — 700102 HCHG RX REV CODE 250 W/ 637 OVERRIDE(OP): Performed by: EMERGENCY MEDICINE

## 2023-11-08 PROCEDURE — 80053 COMPREHEN METABOLIC PANEL: CPT

## 2023-11-08 PROCEDURE — A9270 NON-COVERED ITEM OR SERVICE: HCPCS | Performed by: EMERGENCY MEDICINE

## 2023-11-08 PROCEDURE — 99285 EMERGENCY DEPT VISIT HI MDM: CPT

## 2023-11-08 PROCEDURE — 82077 ASSAY SPEC XCP UR&BREATH IA: CPT

## 2023-11-08 PROCEDURE — 96376 TX/PRO/DX INJ SAME DRUG ADON: CPT

## 2023-11-08 PROCEDURE — 74177 CT ABD & PELVIS W/CONTRAST: CPT

## 2023-11-08 RX ORDER — LORAZEPAM 2 MG/ML
2 INJECTION INTRAMUSCULAR ONCE
Status: COMPLETED | OUTPATIENT
Start: 2023-11-08 | End: 2023-11-08

## 2023-11-08 RX ORDER — ONDANSETRON 2 MG/ML
4 INJECTION INTRAMUSCULAR; INTRAVENOUS ONCE
Status: COMPLETED | OUTPATIENT
Start: 2023-11-08 | End: 2023-11-08

## 2023-11-08 RX ORDER — SODIUM CHLORIDE, SODIUM LACTATE, POTASSIUM CHLORIDE, CALCIUM CHLORIDE 600; 310; 30; 20 MG/100ML; MG/100ML; MG/100ML; MG/100ML
1000 INJECTION, SOLUTION INTRAVENOUS ONCE
Status: COMPLETED | OUTPATIENT
Start: 2023-11-08 | End: 2023-11-08

## 2023-11-08 RX ORDER — KETOROLAC TROMETHAMINE 30 MG/ML
15 INJECTION, SOLUTION INTRAMUSCULAR; INTRAVENOUS ONCE
Status: COMPLETED | OUTPATIENT
Start: 2023-11-08 | End: 2023-11-08

## 2023-11-08 RX ADMIN — SODIUM CHLORIDE, POTASSIUM CHLORIDE, SODIUM LACTATE AND CALCIUM CHLORIDE 1000 ML: 600; 310; 30; 20 INJECTION, SOLUTION INTRAVENOUS at 02:53

## 2023-11-08 RX ADMIN — LORAZEPAM 2 MG: 2 INJECTION INTRAMUSCULAR; INTRAVENOUS at 05:24

## 2023-11-08 RX ADMIN — KETOROLAC TROMETHAMINE 15 MG: 30 INJECTION INTRAMUSCULAR; INTRAVENOUS at 03:10

## 2023-11-08 RX ADMIN — SODIUM CHLORIDE, POTASSIUM CHLORIDE, SODIUM LACTATE AND CALCIUM CHLORIDE 1000 ML: 600; 310; 30; 20 INJECTION, SOLUTION INTRAVENOUS at 04:36

## 2023-11-08 RX ADMIN — ONDANSETRON 4 MG: 2 INJECTION INTRAMUSCULAR; INTRAVENOUS at 02:53

## 2023-11-08 RX ADMIN — FENTANYL CITRATE 50 MCG: 50 INJECTION, SOLUTION INTRAMUSCULAR; INTRAVENOUS at 04:34

## 2023-11-08 RX ADMIN — LIDOCAINE HYDROCHLORIDE 30 ML: 20 SOLUTION OROPHARYNGEAL at 03:54

## 2023-11-08 RX ADMIN — IOHEXOL 100 ML: 350 INJECTION, SOLUTION INTRAVENOUS at 15:15

## 2023-11-08 RX ADMIN — FENTANYL CITRATE 50 MCG: 50 INJECTION, SOLUTION INTRAMUSCULAR; INTRAVENOUS at 03:55

## 2023-11-08 ASSESSMENT — PAIN DESCRIPTION - PAIN TYPE
TYPE: ACUTE PAIN
TYPE: ACUTE PAIN

## 2023-11-08 ASSESSMENT — FIBROSIS 4 INDEX: FIB4 SCORE: 0.97

## 2023-11-08 NOTE — ED NOTES
"Charlie Weaver  38 y.o.  male  Chief Complaint   Patient presents with    Alcohol Intoxication     Pt bib friends for ETOH intoxication, pt is lethargic and states he is feeling unwell \"my head is shrinking\".        "

## 2023-11-08 NOTE — ED NOTES
Patient's home medications have been reviewed by the pharmacy team.   - Pt presented to the ER with alcohol intoxication. Stated that he was feeling unwell.    History reviewed. No pertinent past medical history.    Patient's Medications   New Prescriptions    No medications on file   Previous Medications    OMEPRAZOLE (PRILOSEC) 20 MG DELAYED-RELEASE CAPSULE    Take 1 Capsule by mouth every day.   Modified Medications    No medications on file   Discontinued Medications    No medications on file          A:  Medications do not appear to be contributing to current complaints.       P:    No recommendations at this time. Home medications have been reordered as appropriate.      Willy Augustin, PharmD

## 2023-11-08 NOTE — DISCHARGE SUMMARY
"  ED Observation Discharge Summary    Patient:Charlie Weaver  Patient : 1985  Patient MRN: [id number]  Patient PCP: Pcp Pt States None    Admit Date: 2023  Discharge Date and Time: 23 10:47 AM  Discharge Diagnosis: Alcohol intoxication, abdominal pain, elevated LFTs  Discharge Attending: Cecilia Bonilla M.D.  Discharge Service: ED Observation    ED Course  Charlie is a 38 y.o. male who was evaluated at [facility] for evaluation of alcohol intoxication.  Patient started to complain of abdominal pain and therefore labs were ordered and were pending.  I followed up labs which were notable for elevated LFTs.  His anion gap was also slightly elevated and bicarb is slightly low likely related to dehydration.  His alcohol level was elevated to 18 consistent with intoxication.  Lipase is within normal limits ruling out pancreatitis.  Patient had ongoing pain and LFTs were elevated, likely secondary to alcohol intoxication however right upper quadrant ultrasound was obtained which was unremarkable.  Given ongoing pain I ordered a CT of the abdomen which was also unremarkable.  Patient was treated with IV fluids, fentanyl and Ativan.  After treatment he is feeling greatly improved and tolerating oral intake.  He is alert and oriented and ambulating with a steady gait.  Repeat abdominal exam is benign.  He has a safe ride home.  Therefore he is stable for discharge at this time.  Patient is discharged in stable condition.    Discharge Exam:  /61   Pulse 97   Temp 36.9 °C (98.4 °F) (Temporal)   Resp 17   Ht 1.702 m (5' 7\")   Wt 90.7 kg (200 lb)   SpO2 93%   BMI 31.32 kg/m² .    Constitutional: Awake and alert. Nontoxic  HENT:  Grossly normal  Eyes: Grossly normal  Neck: Normal range of motion  Cardiovascular: Normal heart rate   Thorax & Lungs: No respiratory distress  Abdomen: Nontender  Skin:  No pathologic rash.   Extremities: Well perfused  Psychiatric: Affect " normal    Labs  Results for orders placed or performed during the hospital encounter of 11/08/23   CBC WITH DIFFERENTIAL   Result Value Ref Range    WBC 6.3 4.8 - 10.8 K/uL    RBC 5.35 4.70 - 6.10 M/uL    Hemoglobin 16.3 14.0 - 18.0 g/dL    Hematocrit 48.2 42.0 - 52.0 %    MCV 90.1 81.4 - 97.8 fL    MCH 30.5 27.0 - 33.0 pg    MCHC 33.8 32.3 - 36.5 g/dL    RDW 44.4 35.9 - 50.0 fL    Platelet Count 307 164 - 446 K/uL    MPV 9.1 9.0 - 12.9 fL    Neutrophils-Polys 56.10 44.00 - 72.00 %    Lymphocytes 31.10 22.00 - 41.00 %    Monocytes 10.50 0.00 - 13.40 %    Eosinophils 0.50 0.00 - 6.90 %    Basophils 1.60 0.00 - 1.80 %    Immature Granulocytes 0.20 0.00 - 0.90 %    Nucleated RBC 0.00 0.00 - 0.20 /100 WBC    Neutrophils (Absolute) 3.55 1.82 - 7.42 K/uL    Lymphs (Absolute) 1.96 1.00 - 4.80 K/uL    Monos (Absolute) 0.66 0.00 - 0.85 K/uL    Eos (Absolute) 0.03 0.00 - 0.51 K/uL    Baso (Absolute) 0.10 0.00 - 0.12 K/uL    Immature Granulocytes (abs) 0.01 0.00 - 0.11 K/uL    NRBC (Absolute) 0.00 K/uL   COMP METABOLIC PANEL   Result Value Ref Range    Sodium 139 135 - 145 mmol/L    Potassium 4.0 3.6 - 5.5 mmol/L    Chloride 102 96 - 112 mmol/L    Co2 19 (L) 20 - 33 mmol/L    Anion Gap 18.0 (H) 7.0 - 16.0    Glucose 145 (H) 65 - 99 mg/dL    Bun 7 (L) 8 - 22 mg/dL    Creatinine 0.72 0.50 - 1.40 mg/dL    Calcium 8.5 8.5 - 10.5 mg/dL    Correct Calcium 8.6 8.5 - 10.5 mg/dL    AST(SGOT) 178 (H) 12 - 45 U/L    ALT(SGPT) 227 (H) 2 - 50 U/L    Alkaline Phosphatase 100 (H) 30 - 99 U/L    Total Bilirubin 0.3 0.1 - 1.5 mg/dL    Albumin 3.9 3.2 - 4.9 g/dL    Total Protein 7.2 6.0 - 8.2 g/dL    Globulin 3.3 1.9 - 3.5 g/dL    A-G Ratio 1.2 g/dL   LIPASE   Result Value Ref Range    Lipase 16 11 - 82 U/L   DIAGNOSTIC ALCOHOL   Result Value Ref Range    Diagnostic Alcohol 218.6 (H) <10.1 mg/dL   ESTIMATED GFR   Result Value Ref Range    GFR (CKD-EPI) 121 >60 mL/min/1.73 m 2       Radiology  CT-ABDOMEN-PELVIS WITH   Final Result      1.  No  evidence of acute inflammatory process in the abdomen or pelvis   2.  Hepatic steatosis      US-RUQ   Final Result         1.  Echogenic liver, compatible with fatty change versus fibrosis.          Medications:   New Prescriptions    No medications on file       My final assessment includes alcohol intoxication, elevated LFTs, abdominal pain  Upon Reevaluation, the patient's condition has: Improved; and will be discharged.    Patient discharged from ED Observation status at 10:47 AM on 11/8/2023    Total time spent on this ED Observation discharge encounter is > 30 Minutes    Electronically signed by: Cecilia Bonilla M.D., 11/8/2023 10:47 AM

## 2023-11-08 NOTE — ED PROVIDER NOTES
"ED Provider Note    CHIEF COMPLAINT  Chief Complaint   Patient presents with    Alcohol Intoxication     Pt bib friends for ETOH intoxication, pt is lethargic and states he is feeling unwell \"my head is shrinking\".      HPI/ROS  LIMITATION TO HISTORY   Select: Language Romanian,  Used   OUTSIDE HISTORIAN(S):  zachary    Charlie Weaver is a 38 y.o. male who presents to the emerge department chief complaint alcohol intoxication.  Patient states he was drinking with friends tonight he just does not feel well.  He states maybe he fell but is not sure.  He denies hitting his head but he just states his whole body is hurting his head feels like it is shrinking and swimming.  He denies any actual trauma.  He does not drink daily he takes no medications on a regular basis.  He just keeps saying help me help me    PAST MEDICAL HISTORY       SURGICAL HISTORY  patient denies any surgical history    FAMILY HISTORY  History reviewed. No pertinent family history.    SOCIAL HISTORY  Social History     Tobacco Use    Smoking status: Never    Smokeless tobacco: Never   Vaping Use    Vaping Use: Never used   Substance and Sexual Activity    Alcohol use: Yes     Comment: occ    Drug use: Yes     Comment: meth    Sexual activity: Not on file       CURRENT MEDICATIONS  Home Medications    **Home medications have not yet been reviewed for this encounter**         ALLERGIES  No Known Allergies    PHYSICAL EXAM  VITAL SIGNS: /86   Pulse (!) 108   Temp 37.1 °C (98.8 °F) (Temporal)   Resp 16   Ht 1.702 m (5' 7\")   Wt 90.7 kg (200 lb)   SpO2 96%   BMI 31.32 kg/m²    Pulse OX: Pulse Oxygen level is within normal limits  Constitutional: Alert in mild distress appears uncomfortable smells of alcohol  HENT: Normocephalic, Atraumatic, MMM no midline neck tenderness  Eyes: PERound. Conjunctiva normal, non-icteric.   Heart: Regular rate and rhythm, intact distal pulses   Lungs: Symmetrical movement, no resp distress clear " to auscultation bilaterally  Abdomen: Non-tender, non-distended, normal bowel sounds  EXT/Back no CVA tenderness no edema no signs of trauma  Skin: Warm, Dry, No erythema, No rash.   Neurologic: Alert and oriented, Grossly non-focal.       DIAGNOSTIC STUDIES / PROCEDURES          COURSE & MEDICAL DECISION MAKING    ED Observation Status? Yes; I am placing the patient in to an observation status due to a diagnostic uncertainty as well as therapeutic intensity. Patient placed in observation status at 2:42 AM, 11/8/2023.     Observation plan is as follows: fluids, zofran toradol       INITIAL ASSESSMENT, COURSE AND PLAN/ DISPOSITION AND DISCUSSIONS  Care Narrative: Patient presents emergency department complaining of kind of pain everywhere stating that his head is swimming his body just does not feel right he is feeling count and nauseated.  He is definitely intoxicated but awake.  His abdomen is soft and nontender.  We treated with some IV fluids antiemetics and some Toradol    3:47 AM  Reassessed patient.  After the Toradol he states that his belly is now really hurting kind of throughout his abdomen.  Repeat abdominal examination is nonperitoneal.  We will do some laboratory and Alysis at this time CBC CMP and a lipase as well as an actual alcohol level.      His abdomen is still soft but now he stating that is more epigastric and a little bit the headache.  Earlier he told me he was just drinking tonight but now he tells me he is actually been binge drinking since Thursday of last week.  Mostly a lot of fireball and definitely some cases of beer.  So pancreatitis is now on the differential.  He will be treated with a little bit of fentanyl and a GI cocktail.    He is having a positive response to IV fluids.  He was signed out to my partner to follow-up laboratory analysis and improvement of his symptoms.  I doubt a bacterial infection in the abdomen especially symptoms began after alcohol this evening's alcoholic  gastritis is definitely on the differential.    HYDRATION: Based on the patient's presentation of Tachycardia the patient was given IV fluids. IV Hydration was used because oral hydration was not as rapid as required. Upon recheck following hydration, the patient was mildly improved .          I have discussed management of the patient with the following physicians and LIGIA's:  Dr Bonilla    Discussion of management with other [facility] or appropriate source(s): None     Escalation of care considered, and ultimately not performed:diagnostic imaging      FINAL DIAGNOSIS  1. Epigastric pain    2. Alcohol abuse with intoxication (HCC)           Electronically signed by: Dahlia Adkins M.D., 11/8/2023 2:42 AM

## 2023-11-08 NOTE — ED NOTES
Bedside report received from prior RN. Pt resting. Resp normal/unlabored. Bed side rails up/in low position. Pt updated to POC and all questions answered.     ERP at bedside

## 2023-11-08 NOTE — ED NOTES
Pt complaining of hand cramping, begging staff for help to straighten his hands. ERP notified. Medication ordered.   Medicated pt per MAR.

## 2023-11-08 NOTE — ED NOTES
Pt discharged, all appropriate hospital equipment removed (IV, monitor, pulse ox, etc.). Pt left unit via walking with brother to vehicle for home. Personal belongings with pt when leaving unit. Pt given discharge instructions prior to leaving ER, including where to  prescriptions and when to follow-up if applicable; verbalizes understanding. Pt informed to return to ED if symptoms worsen/return or altered status develop. Copy of discharge instructions signed and turned into DC basket and copy sent with pt.

## 2023-11-08 NOTE — ED TRIAGE NOTES
"Chief Complaint   Patient presents with    Alcohol Intoxication     Pt bib friends for ETOH intoxication, pt is lethargic and states he is feeling unwell \"my head is shrinking\".      Pt escorted to triage via WC with friends for above complaint.  Pt HR upon arrival 140    Pt is alert/oriented and follows commands. Pt speaking in full sentences and responds appropriately to questions. No acute distress noted in triage and respirations are even and unlabored.     Pt placed in lobby and educated on triage process. Pt encouraged to alert staff for any changes in condition.    "

## 2023-11-08 NOTE — ED NOTES
Medication history reviewed with Patient. Med rec is Complete Allergies reviewed.   Patient denies any outpatient antibiotics in the last 30 days.   Anticoagulants (rivaroxaban, apixaban, edoxaban, dabigatran, enoxaparin) taken in the last 14 days? (Yes/No), Anticoagulant: No        Willy Augustin, Rubin

## 2023-11-08 NOTE — ED NOTES
Pt still complaining of abdominal pain, 8/10. Pt requesting additional pain medication, ERP notified.

## 2023-11-11 ENCOUNTER — HOSPITAL ENCOUNTER (EMERGENCY)
Facility: MEDICAL CENTER | Age: 38
End: 2023-11-12
Attending: EMERGENCY MEDICINE

## 2023-11-11 DIAGNOSIS — F12.10 TETRAHYDROCANNABINOL (THC) USE DISORDER, MILD, ABUSE: ICD-10-CM

## 2023-11-11 DIAGNOSIS — K29.20 ACUTE ALCOHOLIC GASTRITIS WITHOUT HEMORRHAGE: ICD-10-CM

## 2023-11-11 DIAGNOSIS — K59.00 CONSTIPATION, UNSPECIFIED CONSTIPATION TYPE: ICD-10-CM

## 2023-11-11 DIAGNOSIS — F41.9 ACUTE ANXIETY: ICD-10-CM

## 2023-11-11 DIAGNOSIS — R10.12 ACUTE BILATERAL UPPER ABDOMINAL PAIN: ICD-10-CM

## 2023-11-11 DIAGNOSIS — R10.11 ACUTE BILATERAL UPPER ABDOMINAL PAIN: ICD-10-CM

## 2023-11-11 DIAGNOSIS — E86.0 DEHYDRATION: ICD-10-CM

## 2023-11-11 DIAGNOSIS — F10.929 ALCOHOLIC INTOXICATION WITH COMPLICATION (HCC): ICD-10-CM

## 2023-11-11 PROCEDURE — 36415 COLL VENOUS BLD VENIPUNCTURE: CPT

## 2023-11-11 PROCEDURE — 99285 EMERGENCY DEPT VISIT HI MDM: CPT

## 2023-11-11 RX ORDER — LORAZEPAM 2 MG/ML
1 INJECTION INTRAMUSCULAR ONCE
Status: DISCONTINUED | OUTPATIENT
Start: 2023-11-12 | End: 2023-11-12

## 2023-11-11 RX ORDER — ONDANSETRON 2 MG/ML
4 INJECTION INTRAMUSCULAR; INTRAVENOUS ONCE
Status: COMPLETED | OUTPATIENT
Start: 2023-11-12 | End: 2023-11-12

## 2023-11-11 RX ORDER — SODIUM CHLORIDE 9 MG/ML
1000 INJECTION, SOLUTION INTRAVENOUS ONCE
Status: COMPLETED | OUTPATIENT
Start: 2023-11-12 | End: 2023-11-12

## 2023-11-11 ASSESSMENT — FIBROSIS 4 INDEX: FIB4 SCORE: 1.39

## 2023-11-12 ENCOUNTER — APPOINTMENT (OUTPATIENT)
Dept: RADIOLOGY | Facility: MEDICAL CENTER | Age: 38
End: 2023-11-12
Attending: EMERGENCY MEDICINE

## 2023-11-12 VITALS
WEIGHT: 193.12 LBS | BODY MASS INDEX: 30.31 KG/M2 | DIASTOLIC BLOOD PRESSURE: 68 MMHG | HEART RATE: 92 BPM | TEMPERATURE: 98.2 F | RESPIRATION RATE: 18 BRPM | HEIGHT: 67 IN | SYSTOLIC BLOOD PRESSURE: 138 MMHG | OXYGEN SATURATION: 96 %

## 2023-11-12 LAB
ALBUMIN SERPL BCP-MCNC: 4.3 G/DL (ref 3.2–4.9)
ALBUMIN/GLOB SERPL: 1.3 G/DL
ALP SERPL-CCNC: 114 U/L (ref 30–99)
ALT SERPL-CCNC: 152 U/L (ref 2–50)
AMMONIA PLAS-SCNC: 21 UMOL/L (ref 11–45)
AMPHET UR QL SCN: NEGATIVE
ANION GAP SERPL CALC-SCNC: 16 MMOL/L (ref 7–16)
APPEARANCE UR: CLEAR
AST SERPL-CCNC: 150 U/L (ref 12–45)
BARBITURATES UR QL SCN: NEGATIVE
BASOPHILS # BLD AUTO: 1.9 % (ref 0–1.8)
BASOPHILS # BLD: 0.12 K/UL (ref 0–0.12)
BENZODIAZ UR QL SCN: NEGATIVE
BILIRUB SERPL-MCNC: 0.3 MG/DL (ref 0.1–1.5)
BILIRUB UR QL STRIP.AUTO: NEGATIVE
BUN SERPL-MCNC: 5 MG/DL (ref 8–22)
BZE UR QL SCN: NEGATIVE
CALCIUM ALBUM COR SERPL-MCNC: 8.3 MG/DL (ref 8.5–10.5)
CALCIUM SERPL-MCNC: 8.5 MG/DL (ref 8.4–10.2)
CANNABINOIDS UR QL SCN: POSITIVE
CHLORIDE SERPL-SCNC: 97 MMOL/L (ref 96–112)
CO2 SERPL-SCNC: 21 MMOL/L (ref 20–33)
COLOR UR: YELLOW
CREAT SERPL-MCNC: 0.73 MG/DL (ref 0.5–1.4)
CRP SERPL HS-MCNC: <0.3 MG/DL (ref 0–0.75)
EKG IMPRESSION: NORMAL
EOSINOPHIL # BLD AUTO: 0.03 K/UL (ref 0–0.51)
EOSINOPHIL NFR BLD: 0.5 % (ref 0–6.9)
ERYTHROCYTE [DISTWIDTH] IN BLOOD BY AUTOMATED COUNT: 42.3 FL (ref 35.9–50)
ETHANOL BLD-MCNC: 167.7 MG/DL
FENTANYL UR QL: NEGATIVE
GFR SERPLBLD CREATININE-BSD FMLA CKD-EPI: 121 ML/MIN/1.73 M 2
GLOBULIN SER CALC-MCNC: 3.2 G/DL (ref 1.9–3.5)
GLUCOSE SERPL-MCNC: 134 MG/DL (ref 65–99)
GLUCOSE UR STRIP.AUTO-MCNC: NEGATIVE MG/DL
HCT VFR BLD AUTO: 49.5 % (ref 42–52)
HGB BLD-MCNC: 16.6 G/DL (ref 14–18)
IMM GRANULOCYTES # BLD AUTO: 0.02 K/UL (ref 0–0.11)
IMM GRANULOCYTES NFR BLD AUTO: 0.3 % (ref 0–0.9)
KETONES UR STRIP.AUTO-MCNC: NEGATIVE MG/DL
LACTATE SERPL-SCNC: 3.8 MMOL/L (ref 0.5–2)
LEUKOCYTE ESTERASE UR QL STRIP.AUTO: NEGATIVE
LIPASE SERPL-CCNC: 13 U/L (ref 11–82)
LYMPHOCYTES # BLD AUTO: 1.89 K/UL (ref 1–4.8)
LYMPHOCYTES NFR BLD: 30.1 % (ref 22–41)
MAGNESIUM SERPL-MCNC: 1.8 MG/DL (ref 1.5–2.5)
MCH RBC QN AUTO: 30.5 PG (ref 27–33)
MCHC RBC AUTO-ENTMCNC: 33.5 G/DL (ref 32.3–36.5)
MCV RBC AUTO: 90.8 FL (ref 81.4–97.8)
METHADONE UR QL SCN: NEGATIVE
MICRO URNS: NORMAL
MONOCYTES # BLD AUTO: 0.55 K/UL (ref 0–0.85)
MONOCYTES NFR BLD AUTO: 8.8 % (ref 0–13.4)
NEUTROPHILS # BLD AUTO: 3.67 K/UL (ref 1.82–7.42)
NEUTROPHILS NFR BLD: 58.4 % (ref 44–72)
NITRITE UR QL STRIP.AUTO: NEGATIVE
NRBC # BLD AUTO: 0 K/UL
NRBC BLD-RTO: 0 /100 WBC (ref 0–0.2)
OPIATES UR QL SCN: NEGATIVE
OXYCODONE UR QL SCN: NEGATIVE
PCP UR QL SCN: NEGATIVE
PH UR STRIP.AUTO: 7 [PH] (ref 5–8)
PLATELET # BLD AUTO: 347 K/UL (ref 164–446)
PMV BLD AUTO: 9.5 FL (ref 9–12.9)
POTASSIUM SERPL-SCNC: 3.8 MMOL/L (ref 3.6–5.5)
PROPOXYPH UR QL SCN: NEGATIVE
PROT SERPL-MCNC: 7.5 G/DL (ref 6–8.2)
PROT UR QL STRIP: NEGATIVE MG/DL
RBC # BLD AUTO: 5.45 M/UL (ref 4.7–6.1)
RBC UR QL AUTO: NEGATIVE
SODIUM SERPL-SCNC: 134 MMOL/L (ref 135–145)
SP GR UR STRIP.AUTO: <=1.005
WBC # BLD AUTO: 6.3 K/UL (ref 4.8–10.8)

## 2023-11-12 PROCEDURE — 82077 ASSAY SPEC XCP UR&BREATH IA: CPT

## 2023-11-12 PROCEDURE — 36415 COLL VENOUS BLD VENIPUNCTURE: CPT

## 2023-11-12 PROCEDURE — 96374 THER/PROPH/DIAG INJ IV PUSH: CPT

## 2023-11-12 PROCEDURE — 83735 ASSAY OF MAGNESIUM: CPT

## 2023-11-12 PROCEDURE — 81003 URINALYSIS AUTO W/O SCOPE: CPT

## 2023-11-12 PROCEDURE — 83690 ASSAY OF LIPASE: CPT

## 2023-11-12 PROCEDURE — 74022 RADEX COMPL AQT ABD SERIES: CPT

## 2023-11-12 PROCEDURE — 93005 ELECTROCARDIOGRAM TRACING: CPT | Performed by: EMERGENCY MEDICINE

## 2023-11-12 PROCEDURE — 82140 ASSAY OF AMMONIA: CPT

## 2023-11-12 PROCEDURE — 83605 ASSAY OF LACTIC ACID: CPT

## 2023-11-12 PROCEDURE — 86140 C-REACTIVE PROTEIN: CPT

## 2023-11-12 PROCEDURE — 85025 COMPLETE CBC W/AUTO DIFF WBC: CPT

## 2023-11-12 PROCEDURE — 80307 DRUG TEST PRSMV CHEM ANLYZR: CPT

## 2023-11-12 PROCEDURE — 700111 HCHG RX REV CODE 636 W/ 250 OVERRIDE (IP): Mod: JZ | Performed by: EMERGENCY MEDICINE

## 2023-11-12 PROCEDURE — 700105 HCHG RX REV CODE 258: Performed by: EMERGENCY MEDICINE

## 2023-11-12 PROCEDURE — 80053 COMPREHEN METABOLIC PANEL: CPT

## 2023-11-12 PROCEDURE — 96375 TX/PRO/DX INJ NEW DRUG ADDON: CPT

## 2023-11-12 RX ORDER — OMEPRAZOLE 20 MG/1
20 CAPSULE, DELAYED RELEASE ORAL DAILY
Qty: 30 CAPSULE | Refills: 1 | Status: SHIPPED | OUTPATIENT
Start: 2023-11-12

## 2023-11-12 RX ORDER — POLYETHYLENE GLYCOL 3350 17 G/17G
17 POWDER ORAL DAILY
Qty: 119 G | Refills: 0 | Status: SHIPPED | OUTPATIENT
Start: 2023-11-12

## 2023-11-12 RX ORDER — SODIUM CHLORIDE 9 MG/ML
1000 INJECTION, SOLUTION INTRAVENOUS ONCE
Status: COMPLETED | OUTPATIENT
Start: 2023-11-12 | End: 2023-11-12

## 2023-11-12 RX ORDER — LORAZEPAM 2 MG/ML
1 INJECTION INTRAMUSCULAR ONCE
Status: COMPLETED | OUTPATIENT
Start: 2023-11-12 | End: 2023-11-12

## 2023-11-12 RX ADMIN — SODIUM CHLORIDE 1000 ML: 9 INJECTION, SOLUTION INTRAVENOUS at 01:00

## 2023-11-12 RX ADMIN — ONDANSETRON 4 MG: 2 INJECTION INTRAMUSCULAR; INTRAVENOUS at 00:15

## 2023-11-12 RX ADMIN — FAMOTIDINE 20 MG: 10 INJECTION, SOLUTION INTRAVENOUS at 00:15

## 2023-11-12 RX ADMIN — SODIUM CHLORIDE 1000 ML: 9 INJECTION, SOLUTION INTRAVENOUS at 00:15

## 2023-11-12 RX ADMIN — LORAZEPAM 1 MG: 2 INJECTION INTRAMUSCULAR; INTRAVENOUS at 00:30

## 2023-11-12 NOTE — ED PROVIDER NOTES
ED Provider Note    CHIEF COMPLAINT  Chief Complaint   Patient presents with    Neck Pain     Headache and neck pain no trauma.     Abdominal Pain     Started this evening after having a few drinks and gummies prior to coming to bed. Patient states the pain is making it hard to breathe.        EXTERNAL RECORDS REVIEWED  Other reviewed discharge summary by Dr. Bonilla dated November 8, 2023.  Patient initially admitted to emergency department observation November 8, 2023.  Diagnosed with alcohol intoxication and abdominal pain.  Reviewed right upper quadrant ultrasound and CT imaging of the abdomen pelvis from the same date which demonstrated no evidence of inflammatory process within the abdomen or pelvis no evidence of cholecystitis.  Patient was feeling better after IV fluids, fentanyl, and lorazepam.  He was able to tolerate p.o. and was able to be discharged home with a safe ride.    HPI/ROS  LIMITATION TO HISTORY   Select: Language Bel[place]ian,  Used  and Intoxication  OUTSIDE HISTORIAN(S):  Significant other notes patient has problems with chronic alcohol use, provides majority of history as the patient is very anxious    Charlie Weaver is a 38 y.o. male who presents for evaluation of multiple concerns.  Patient was seen in this facility recently on the eighth for alcohol intoxication abdominal pain as noted above.  He had unremarkable gallbladder ultrasound and CT imaging the abdomen pelvis at that time.  Patient was at his usual state of health this evening, he had 2 alcoholic beverages, he has chronic difficulty sleeping and took 2 THC edible Gummies before bed.  Patient became gradually more anxious, he is complaining of increased thirst, dry mouth, and worsening bilateral upper abdominal pain with radiation to the neck.  Notes generalized headache, no head or neck trauma.  No numbness nor weakness but is very anxious.  No nausea, no vomiting, no diarrhea.  No fever.  Patient's spouse is  "justifiably concerned noting patient does have problems with chronic alcohol use and does not usually present like this after drinking or using THC.  As such they came to be assessed.    PAST MEDICAL HISTORY   Alcohol abuse, abnormal LFTs    SURGICAL HISTORY  patient denies any surgical history    FAMILY HISTORY  Noncontributory acutely    SOCIAL HISTORY  Social History     Tobacco Use    Smoking status: Never    Smokeless tobacco: Never   Vaping Use    Vaping Use: Never used   Substance and Sexual Activity    Alcohol use: Yes     Comment: occ    Drug use: Yes     Comment: meth    Sexual activity: Not on file       CURRENT MEDICATIONS  Home Medications       Reviewed by Gene Page R.N. (Registered Nurse) on 11/11/23 at 2343  Med List Status: Not Addressed     Medication Last Dose Status   omeprazole (PRILOSEC) 20 MG delayed-release capsule  Active                    ALLERGIES  No Known Allergies    PHYSICAL EXAM  VITAL SIGNS: /68   Pulse 92   Temp 36.8 °C (98.2 °F) (Temporal)   Resp 18   Ht 1.702 m (5' 7\")   Wt 87.6 kg (193 lb 2 oz)   SpO2 96%   BMI 30.25 kg/m²    General: Alert, mild acute distress  Skin: Warm, dry, normal for ethnicity  Head: Normocephalic, atraumatic  Neck: Trachea midline, no tenderness to palpation of midline, no step-off.  Neck is supple, no meningismus.  Eye: PERRL, normal conjunctiva, sclera are anicteric  ENMT: Oral mucosa pink and very dry, no pharyngeal erythema or exudate  Cardiovascular: S1, S2, moderately tachycardic, otherwise regular rate and rhythm, No murmur, Normal peripheral perfusion  Respiratory: Lungs CTA, respirations are non-labored, breath sounds are equal  Gastrointestinal: Soft, tenderness to the bilateral upper quadrants in the epigastrium, no guarding, no rebound, no rigidity.  Abdomen nondistended, bowel sounds are mildly hypoactive.  Musculoskeletal: No swelling, no deformity  Neurological: Alert and oriented to person, place, time, and situation.  " Cranial nerves II through over grossly intact, moving all 4 extremities symmetrically.  Lymphatics: No cervical lymphadenopathy  Psychiatric: Cooperative, very anxious, otherwise appropriate mood & affect     DIAGNOSTIC STUDIES / PROCEDURES  EKG  I have independently interpreted this EKG  EKG Interpretation    Interpreted by emergency department physician    Rhythm: sinus tachycardia  Rate: 102  Axis: normal  Ectopy: none  Conduction: right bundle branch block (incomplete)  ST Segments: nonspecific changes  T Waves: no acute change  Q Waves: none    Clinical Impression: non-specific EKG, no pathologic change compared to previous EKG dated June 1, 2023    LABS  Results for orders placed or performed during the hospital encounter of 11/11/23   URINE DRUG SCREEN (TRIAGE)   Result Value Ref Range    Amphetamines Urine Negative Negative    Barbiturates Negative Negative    Benzodiazepines Negative Negative    Cocaine Metabolite Negative Negative    Fentanyl, Urine Negative Negative    Methadone Negative Negative    Opiates Negative Negative    Oxycodone Negative Negative    Phencyclidine -Pcp Negative Negative    Propoxyphene Negative Negative    Cannabinoid Metab Positive (A) Negative   CBC WITH DIFFERENTIAL   Result Value Ref Range    WBC 6.3 4.8 - 10.8 K/uL    RBC 5.45 4.70 - 6.10 M/uL    Hemoglobin 16.6 14.0 - 18.0 g/dL    Hematocrit 49.5 42.0 - 52.0 %    MCV 90.8 81.4 - 97.8 fL    MCH 30.5 27.0 - 33.0 pg    MCHC 33.5 32.3 - 36.5 g/dL    RDW 42.3 35.9 - 50.0 fL    Platelet Count 347 164 - 446 K/uL    MPV 9.5 9.0 - 12.9 fL    Neutrophils-Polys 58.40 44.00 - 72.00 %    Lymphocytes 30.10 22.00 - 41.00 %    Monocytes 8.80 0.00 - 13.40 %    Eosinophils 0.50 0.00 - 6.90 %    Basophils 1.90 (H) 0.00 - 1.80 %    Immature Granulocytes 0.30 0.00 - 0.90 %    Nucleated RBC 0.00 0.00 - 0.20 /100 WBC    Neutrophils (Absolute) 3.67 1.82 - 7.42 K/uL    Lymphs (Absolute) 1.89 1.00 - 4.80 K/uL    Monos (Absolute) 0.55 0.00 - 0.85 K/uL     Eos (Absolute) 0.03 0.00 - 0.51 K/uL    Baso (Absolute) 0.12 0.00 - 0.12 K/uL    Immature Granulocytes (abs) 0.02 0.00 - 0.11 K/uL    NRBC (Absolute) 0.00 K/uL   COMP METABOLIC PANEL   Result Value Ref Range    Sodium 134 (L) 135 - 145 mmol/L    Potassium 3.8 3.6 - 5.5 mmol/L    Chloride 97 96 - 112 mmol/L    Co2 21 20 - 33 mmol/L    Anion Gap 16.0 7.0 - 16.0    Glucose 134 (H) 65 - 99 mg/dL    Bun 5 (L) 8 - 22 mg/dL    Creatinine 0.73 0.50 - 1.40 mg/dL    Calcium 8.5 8.4 - 10.2 mg/dL    Correct Calcium 8.3 (L) 8.5 - 10.5 mg/dL    AST(SGOT) 150 (H) 12 - 45 U/L    ALT(SGPT) 152 (H) 2 - 50 U/L    Alkaline Phosphatase 114 (H) 30 - 99 U/L    Total Bilirubin 0.3 0.1 - 1.5 mg/dL    Albumin 4.3 3.2 - 4.9 g/dL    Total Protein 7.5 6.0 - 8.2 g/dL    Globulin 3.2 1.9 - 3.5 g/dL    A-G Ratio 1.3 g/dL   LIPASE   Result Value Ref Range    Lipase 13 11 - 82 U/L   CRP QUANTITIVE (NON-CARDIAC)   Result Value Ref Range    Stat C-Reactive Protein <0.30 0.00 - 0.75 mg/dL   LACTIC ACID   Result Value Ref Range    Lactic Acid 3.8 (H) 0.5 - 2.0 mmol/L   AMMONIA   Result Value Ref Range    Ammonia 21 11 - 45 umol/L   ETHYL ALCOHOL (BLOOD)   Result Value Ref Range    Diagnostic Alcohol 167.7 (H) <10.1 mg/dL   ESTIMATED GFR   Result Value Ref Range    GFR (CKD-EPI) 121 >60 mL/min/1.73 m 2   MAGNESIUM   Result Value Ref Range    Magnesium 1.8 1.5 - 2.5 mg/dL   URINALYSIS   Result Value Ref Range    Color Yellow     Character Clear     Specific Gravity <=1.005 <1.035    Ph 7.0 5.0 - 8.0    Glucose Negative Negative mg/dL    Ketones Negative Negative mg/dL    Protein Negative Negative mg/dL    Bilirubin Negative Negative    Nitrite Negative Negative    Leukocyte Esterase Negative Negative    Occult Blood Negative Negative    Micro Urine Req see below    EKG   Result Value Ref Range    Report       [facility]    Test Date:  2023-11-12  Pt Name:    FITO DENNISON      Department: [facility]  MRN:         [id number]                      [facility]  Gender:     Male                         Technician: desire  :        1985                   Requested By:CHIDI ELAINE  Order #:    [id number]                    Reading MD: CHIDI ELAINE MD    Measurements  Intervals                                Axis  Rate:       102                          P:          26  SD:         152                          QRS:        -1  QRSD:       112                          T:          13  QT:         350  QTc:        456    Interpretive Statements  Sinus tachycardia  Incomplete right bundle branch block  Compared to ECG 2023 10:25:34  Incomplete right bundle-branch block now present    Electronically Signed On 2023 01:01:02 PST by CHIDI ELAINE MD          RADIOLOGY  I have independently interpreted the diagnostic imaging associated with this visit and am waiting the final reading from the radiologist.   My preliminary interpretation is as follows: Consistent with constipation, no evidence of free air/perforation or obstructive bowel gas pattern  Radiologist interpretation:   DX-ABDOMEN COMPLETE WITH AP OR PA CXR   Final Result         1.  There is a moderate to large amount of colonic stool.   2.  No bowel obstruction.   3.  Lungs are hypoinflated with bibasilar atelectasis.           COURSE & MEDICAL DECISION MAKING    ED Observation Status? Yes; I am placing the patient in to an observation status due to a diagnostic uncertainty as well as therapeutic intensity. Patient placed in observation status at 11:51 PM, 2023.     Observation plan is as follows: Patient medicated with lorazepam 1 mg IV, Zofran 4 mg IV, famotidine 20 mg IV, 1 L of normal saline.  Metabolic work-up including lipase and lactic acid as well as toxicologic work-up including drug screen and ammonia level and alcohol level will be obtained.  Differential diagnosis at this point includes but is not restricted  "to alcoholic gastritis, alcoholic pancreatitis, THC intoxication, alcohol intoxication, dehydration, electrolyte derangement, acute kidney injury, viral illness    0035: Heart rate is improving with lorazepam and IV fluids, still tachycardic, currently 102.  Laboratory analysis demonstrate indeed ethanol level is somewhat elevated but not significantly so and so when he drinks on a regular basis.  Transaminases are elevated but improved from previous other than his alkaline phosphatase.  Lactic acid is significantly elevated, given lack of fever or leukocytosis or apparent infectious source this likely is from significant volume depletion.  As such I have ordered a second liter of IV fluid to be started after the first is complete.    0116: Heart rate continuing to improve with IV fluids, currently 101.  Patient reassessed.  Patient and significant other updated with reassuring studies.  Thankfully he is feeling much better at this point and is resting comfortably.    Patient Vitals for the past 24 hrs:   BP Temp Temp src Pulse Resp SpO2 Height Weight   11/12/23 0120 138/68 -- -- -- -- 92 % -- --   11/12/23 0110 -- -- -- 98 -- 96 % -- --   11/12/23 0053 131/76 -- -- (!) 101 18 93 % -- --   11/12/23 0020 (!) 144/80 -- -- (!) 102 (!) 23 92 % -- --   11/11/23 2343 (!) 153/96 36.8 °C (98.2 °F) Temporal (!) 127 20 98 % -- --   11/11/23 2340 -- -- -- -- -- -- 1.702 m (5' 7\") 87.6 kg (193 lb 2 oz)        Upon Reevaluation, the patient's condition has: Improved; and will be discharged.    Patient discharged from ED Observation status at 0153 (Time) 11/12/23 (Date).     INITIAL ASSESSMENT, COURSE AND PLAN  Care Narrative: This patient is a 38-year-old male with history of heavy drinking who presents for evaluation of feeling very anxious, upper abdominal pain radiating to the neck, and dry mouth after drinking 2 alcoholic beverages and using oral THC.  He is tachycardic on the exam and has very dry oral mucous membranes; " presentation is consistent with significant dehydration, clear indication for IV fluids.  Presentation is not typical for isolated alcohol intoxication or THC intoxication.  As such metabolic work-up will be obtained.  I reviewed reassuring imaging of the abdomen/pelvis both CT and ultrasound dated 3 days ago.  His abdominal exam is nonsurgical with no peritoneal signs, as such no indication for advanced imaging but abdominal x-ray series is clearly indicated.  Metabolic/toxicologic work-up obtained as delineated above.      The studies demonstrate elevated lactic acid consistent with volume depletion but thankfully no evidence suggestive of sepsis.  He is tachycardic but has no fever, no hypotension, no leukocytosis, no left shift, and no evidence of infectious source.  Transaminases are elevated consistent with alcohol use but thankfully improved from previous.  EKG is reassuring, no other significant electrolyte derangement.  He is feeling better with the above management.  I suspect likely an adverse reaction  HYDRATION: Based on the patient's presentation of Dehydration and Tachycardia the patient was given IV fluids. IV Hydration was used because oral hydration was not as rapid as required. Upon recheck following hydration, the patient was doing better, tachycardia resolved, current heart rate is 98.      ADDITIONAL PROBLEM LIST  Alcohol abuse, THC abuse, dehydration, constipation, alcoholic gastritis  DISPOSITION AND DISCUSSIONS  I have discussed management of the patient with the following physicians and LIGIA's:  NA    Discussion of management with other QHP or appropriate source(s): None     Escalation of care considered, and ultimately not performed:acute inpatient care management, however at this time, the patient is most appropriate for outpatient management    Barriers to care at this time, including but not limited to: Patient does not have established PCP.     Decision tools and prescription drugs  considered including, but not limited to:  SIRS criteria for sepsis not met .    The patient will return for new or worsening symptoms and is stable at the time of discharge.    Patient has had high blood pressure while in the emergency department, felt likely secondary to medical condition. Counseled patient to monitor blood pressure at home and follow up with primary care physician.      DISPOSITION:  Patient will be discharged home in stable condition.    FOLLOW UP:  Xavier Li M.D.  [address]  [place] [address]  [phone]    Schedule an appointment as soon as possible for a visit         OUTPATIENT MEDICATIONS:  Discharge Medication List as of 11/12/2023  1:47 AM        START taking these medications    Details   polyethylene glycol/lytes (MIRALAX) 17 g Pack Take 1 Packet by mouth every day., Disp-5 Each, R-0, Normal                FINAL DIAGNOSIS  1. Tetrahydrocannabinol (THC) use disorder, mild, abuse    2. Acute bilateral upper abdominal pain    3. Dehydration    4. Alcoholic intoxication with complication (HCC)    5. Constipation, unspecified constipation type    6. Acute anxiety    7. Acute alcoholic gastritis without hemorrhage           Electronically signed by: Emelina Awan M.D., 11/11/2023 11:39 PM

## 2023-11-17 ENCOUNTER — TELEPHONE (OUTPATIENT)
Dept: HEALTH INFORMATION MANAGEMENT | Facility: OTHER | Age: 38
End: 2023-11-17